# Patient Record
Sex: MALE | Race: WHITE | NOT HISPANIC OR LATINO | Employment: FULL TIME | URBAN - METROPOLITAN AREA
[De-identification: names, ages, dates, MRNs, and addresses within clinical notes are randomized per-mention and may not be internally consistent; named-entity substitution may affect disease eponyms.]

---

## 2019-03-08 ENCOUNTER — HOSPITAL ENCOUNTER (OUTPATIENT)
Dept: OTHER | Facility: HOSPITAL | Age: 44
Discharge: HOME | End: 2019-03-08
Attending: FAMILY MEDICINE | Admitting: FAMILY MEDICINE

## 2019-06-21 ENCOUNTER — OFFICE VISIT (OUTPATIENT)
Dept: FAMILY MEDICINE CLINIC | Facility: CLINIC | Age: 44
End: 2019-06-21

## 2019-06-21 VITALS
SYSTOLIC BLOOD PRESSURE: 118 MMHG | TEMPERATURE: 97.8 F | DIASTOLIC BLOOD PRESSURE: 78 MMHG | HEIGHT: 69 IN | RESPIRATION RATE: 18 BRPM | OXYGEN SATURATION: 99 % | HEART RATE: 67 BPM | WEIGHT: 156.4 LBS | BODY MASS INDEX: 23.16 KG/M2

## 2019-06-21 DIAGNOSIS — M79.674 PAIN OF TOE OF RIGHT FOOT: Primary | ICD-10-CM

## 2019-06-21 PROBLEM — F17.200 TOBACCO USE DISORDER: Status: ACTIVE | Noted: 2019-06-21

## 2019-06-21 PROBLEM — Z13.9 ENCOUNTER FOR SCREENING: Status: ACTIVE | Noted: 2019-06-21

## 2019-06-21 PROBLEM — Z13.220 SCREENING FOR HYPERLIPIDEMIA: Status: ACTIVE | Noted: 2019-06-21

## 2019-06-21 PROBLEM — H26.9 CATARACTS, BILATERAL: Status: ACTIVE | Noted: 2019-06-21

## 2019-06-21 PROBLEM — Z00.01 ENCOUNTER FOR ANNUAL GENERAL MEDICAL EXAMINATION WITH ABNORMAL FINDINGS IN ADULT: Status: ACTIVE | Noted: 2019-06-21

## 2019-06-21 PROCEDURE — 99213 OFFICE O/P EST LOW 20 MIN: CPT | Performed by: FAMILY MEDICINE

## 2019-06-21 RX ORDER — SULFAMETHOXAZOLE AND TRIMETHOPRIM 800; 160 MG/1; MG/1
1 TABLET ORAL 2 TIMES DAILY
Qty: 20 TABLET | Refills: 10 | Status: SHIPPED | OUTPATIENT
Start: 2019-06-21

## 2019-06-21 RX ORDER — METHYLPREDNISOLONE 4 MG/1
TABLET ORAL
Qty: 21 EACH | Refills: 0 | Status: SHIPPED | OUTPATIENT
Start: 2019-06-21

## 2019-06-21 NOTE — PATIENT INSTRUCTIONS
Gout  Gout is painful swelling that can happen in some of your joints. Gout is a type of arthritis. This condition is caused by having too much uric acid in your body. Uric acid is a chemical that is made when your body breaks down substances called purines. If your body has too much uric acid, sharp crystals can form and build up in your joints. This causes pain and swelling.  Gout attacks can happen quickly and be very painful (acute gout). Over time, the attacks can affect more joints and happen more often (chronic gout).  Follow these instructions at home:  During a gout attack  · If directed, put ice on the painful area:  ? Put ice in a plastic bag.  ? Place a towel between your skin and the bag.  ? Leave the ice on for 20 minutes, 2-3 times a day.  · Rest the joint as much as possible. If the joint is in your leg, you may be given crutches to use.  · Raise (elevate) the painful joint above the level of your heart as often as you can.  · Drink enough fluids to keep your pee (urine) pale yellow.  · Take over-the-counter and prescription medicines only as told by your doctor.  · Do not drive or use heavy machinery while taking prescription pain medicine.  · Follow instructions from your doctor about what you can or cannot eat and drink.  · Return to your normal activities as told by your doctor. Ask your doctor what activities are safe for you.  Avoiding future gout attacks  · Follow a low-purine diet as told by a specialist (dietitian) or your doctor. Avoid foods and drinks that have a lot of purines, such as:  ? Liver.  ? Kidney.  ? Anchovies.  ? Asparagus.  ? Herring.  ? Mushrooms  ? Mussels.  ? Beer.  · Limit alcohol intake to no more than 1 drink a day for nonpregnant women and 2 drinks a day for men. One drink equals 12 oz of beer, 5 oz of wine, or 1½ oz of hard liquor.  · Stay at a healthy weight or lose weight if you are overweight. If you want to lose weight, talk with your doctor. It is important that  you do not lose weight too fast.  · Start or continue an exercise plan as told by your doctor.  · Drink enough fluids to keep your pee pale yellow.  · Take over-the-counter and prescription medicines only as told by your doctor.  · Keep all follow-up visits as told by your doctor. This is important.  Contact a doctor if:  · You have another gout attack.  · You still have symptoms of a gout attack after10 days of treatment.  · You have problems (side effects) because of your medicines.  · You have chills or a fever.  · You have burning pain when you pee (urinate).  · You have pain in your lower back or belly.  Get help right away if:  · You have very bad pain.  · Your pain cannot be controlled.  · You cannot pee.  This information is not intended to replace advice given to you by your health care provider. Make sure you discuss any questions you have with your health care provider.  Document Released: 09/26/2009 Document Revised: 08/01/2018 Document Reviewed: 09/29/2016  [employer] Patient Education © 2019 [employer]

## 2019-06-21 NOTE — PROGRESS NOTES
"Subjective   Jim Quiñones is a 43 y.o. male.     Pain   This is a new problem. The current episode started in the past 7 days. The problem occurs constantly. The problem has been gradually worsening. Associated symptoms include arthralgias, joint swelling (right foot 4th toe) and numbness. Pertinent negatives include no chills, fever or rash. The symptoms are aggravated by walking (Shoe). He has tried ice and NSAIDs for the symptoms. The treatment provided no relief.        The following portions of the patient's history were reviewed and updated as appropriate: allergies, current medications, past family history, past medical history, past social history, past surgical history and problem list.    Review of Systems   Constitutional: Negative for chills and fever.   Eyes: Negative for visual disturbance.   Respiratory: Negative for shortness of breath.    Cardiovascular: Negative for leg swelling.   Musculoskeletal: Positive for arthralgias and joint swelling (right foot 4th toe).   Skin: Negative for rash and bruise.   Neurological: Positive for numbness. Negative for dizziness.   Hematological: Negative for adenopathy.       Objective    Vitals:    06/21/19 1046   BP: 118/78   BP Location: Left arm   Patient Position: Sitting   Cuff Size: Adult   Pulse: 67   Resp: 18   Temp: 97.8 °F (36.6 °C)   TempSrc: Oral   SpO2: 99%   Weight: 70.9 kg (156 lb 6.4 oz)   Height: 175.3 cm (69\")     Physical Exam   Constitutional: He is oriented to person, place, and time. He appears well-developed and well-nourished.   HENT:   Head: Normocephalic and atraumatic.   Eyes: Conjunctivae and EOM are normal.   Neck: Normal range of motion. Neck supple.   Cardiovascular: Normal rate, regular rhythm and normal heart sounds.   Pulses:       Dorsalis pedis pulses are 2+ on the right side.   Pulmonary/Chest: Effort normal and breath sounds normal.   Musculoskeletal: He exhibits no edema or deformity.        Neurological: He is alert and " oriented to person, place, and time.   Skin: Skin is warm and dry. Capillary refill takes less than 2 seconds.         Assessment/Plan   Jim was seen today for pain.    Diagnoses and all orders for this visit:    Pain of toe of right foot  Comments:  Etiologies include gout, infection, trauma.  Check labs.  Treat with medications as prescribed in orders. F/U in 5-7 days if not improving.  Orders:  -     Uric acid  -     Sedimentation rate, automated  -     CBC Auto Differential  -     methylPREDNISolone (MEDROL, MATT,) 4 MG tablet; Take as directed on package instructions.  -     sulfamethoxazole-trimethoprim (BACTRIM DS) 800-160 MG per tablet; Take 1 tablet by mouth 2 (Two) Times a Day.    Other orders  -     CBC & Differential

## 2019-06-22 LAB
BASOPHILS # BLD AUTO: 0 X10E3/UL (ref 0–0.2)
BASOPHILS NFR BLD AUTO: 0 %
EOSINOPHIL # BLD AUTO: 0.1 X10E3/UL (ref 0–0.4)
EOSINOPHIL NFR BLD AUTO: 2 %
ERYTHROCYTE [DISTWIDTH] IN BLOOD BY AUTOMATED COUNT: 13.7 % (ref 12.3–15.4)
ERYTHROCYTE [SEDIMENTATION RATE] IN BLOOD BY WESTERGREN METHOD: 7 MM/HR (ref 0–15)
HCT VFR BLD AUTO: 44.9 % (ref 37.5–51)
HGB BLD-MCNC: 15.6 G/DL (ref 13–17.7)
IMM GRANULOCYTES # BLD AUTO: 0 X10E3/UL (ref 0–0.1)
IMM GRANULOCYTES NFR BLD AUTO: 0 %
LYMPHOCYTES # BLD AUTO: 3.8 X10E3/UL (ref 0.7–3.1)
LYMPHOCYTES NFR BLD AUTO: 43 %
MCH RBC QN AUTO: 32.6 PG (ref 26.6–33)
MCHC RBC AUTO-ENTMCNC: 34.7 G/DL (ref 31.5–35.7)
MCV RBC AUTO: 94 FL (ref 79–97)
MONOCYTES # BLD AUTO: 0.8 X10E3/UL (ref 0.1–0.9)
MONOCYTES NFR BLD AUTO: 9 %
NEUTROPHILS # BLD AUTO: 4.2 X10E3/UL (ref 1.4–7)
NEUTROPHILS NFR BLD AUTO: 46 %
PLATELET # BLD AUTO: 234 X10E3/UL (ref 150–450)
RBC # BLD AUTO: 4.79 X10E6/UL (ref 4.14–5.8)
URATE SERPL-MCNC: 5.5 MG/DL (ref 3.7–8.6)
WBC # BLD AUTO: 9 X10E3/UL (ref 3.4–10.8)

## 2019-07-19 ENCOUNTER — TELEPHONE (OUTPATIENT)
Dept: FAMILY MEDICINE CLINIC | Facility: CLINIC | Age: 44
End: 2019-07-19

## 2019-07-19 DIAGNOSIS — M79.674 PAIN OF TOE OF RIGHT FOOT: Primary | ICD-10-CM

## 2019-07-19 RX ORDER — INDOMETHACIN 25 MG/1
25 CAPSULE ORAL 3 TIMES DAILY
Qty: 30 CAPSULE | Refills: 0 | Status: SHIPPED | OUTPATIENT
Start: 2019-07-19 | End: 2019-07-29

## 2019-07-19 NOTE — TELEPHONE ENCOUNTER
IshmaelCarlosen  Male, 43 y.o., 1975  PCP:   None  Last Weight:   None  Phone:   M: [phone]  Allergies  Unknown: Not on File  Health Maintenance:   Due  FYI:   None  Primary Ins.:   None  MRN:   [id number]  MyChart:   Inactive  Pharmacy:   None  Preferred Lab:   None  Next Appt with Me:   None  Next Appt Date by Dept:   None  Patient Calls     Carmelita Becerra RegSched Rep routed conversation to You 7 hours ago (11:37 AM)      Jim Quiñones [phone]  Carmelita Becerra RegSched Rep 7 hours ago (11:34 AM)      Carmelita Becerra RegSched Rep 7 hours ago (11:34 AM)         PT SEEN ABOUT 3 WEEKS AGO  FOR SWOLLEN TOES -    STILL SWOLLEN AND PAINFUL -    ASKS FOR STEROID OR ANTIINFLAMMATORY -   [facility]   [address]            Documentation

## 2019-07-26 ENCOUNTER — HOSPITAL ENCOUNTER (OUTPATIENT)
Dept: GENERAL RADIOLOGY | Facility: HOSPITAL | Age: 44
Discharge: HOME | End: 2019-07-26
Admitting: FAMILY MEDICINE

## 2019-07-26 DIAGNOSIS — M79.674 PAIN OF TOE OF RIGHT FOOT: ICD-10-CM

## 2019-07-26 PROCEDURE — 73630 X-RAY EXAM OF FOOT: CPT

## 2019-07-26 NOTE — PROGRESS NOTES
Please let patient know results of his x-ray.  There is soft tissue swelling but not bony abnormalities.  I recommend consultation with podiatry.  If agreeable, refer to Dr. López.  Thanks.

## 2019-08-01 DIAGNOSIS — M79.674 PAIN OF TOE OF RIGHT FOOT: Primary | ICD-10-CM
